# Patient Record
Sex: MALE | Race: WHITE | URBAN - METROPOLITAN AREA
[De-identification: names, ages, dates, MRNs, and addresses within clinical notes are randomized per-mention and may not be internally consistent; named-entity substitution may affect disease eponyms.]

---

## 2018-11-30 ENCOUNTER — OFFICE VISIT (OUTPATIENT)
Dept: PRIMARY CARE CLINIC | Age: 33
End: 2018-11-30

## 2018-11-30 VITALS
BODY MASS INDEX: 35.76 KG/M2 | WEIGHT: 264 LBS | TEMPERATURE: 98.2 F | HEART RATE: 75 BPM | OXYGEN SATURATION: 98 % | RESPIRATION RATE: 17 BRPM | HEIGHT: 72 IN | DIASTOLIC BLOOD PRESSURE: 85 MMHG | SYSTOLIC BLOOD PRESSURE: 148 MMHG

## 2018-11-30 DIAGNOSIS — R09.89 CHEST CONGESTION: ICD-10-CM

## 2018-11-30 DIAGNOSIS — H65.93 OTHER NONSUPPURATIVE OTITIS MEDIA OF BOTH EARS, UNSPECIFIED CHRONICITY: Primary | ICD-10-CM

## 2018-11-30 PROBLEM — E66.01 SEVERE OBESITY (HCC): Status: ACTIVE | Noted: 2018-11-30

## 2018-11-30 RX ORDER — AZITHROMYCIN 250 MG/1
250 TABLET, FILM COATED ORAL SEE ADMIN INSTRUCTIONS
Qty: 6 TAB | Refills: 0 | Status: SHIPPED | OUTPATIENT
Start: 2018-11-30 | End: 2018-12-05

## 2018-11-30 NOTE — PROGRESS NOTES
Chief Complaint Patient presents with  Ear Pain  
pt c/o ear pain, L>R, congestion and sore throat since the beginning of the week, pt states he tried otc nyquil and chaya seltzer cold and cough to help with discomfort. This note will not be viewable in [address]

## 2018-11-30 NOTE — PATIENT INSTRUCTIONS

## 2018-11-30 NOTE — PROGRESS NOTES
Subjective:  
  
Carly Santo is a 34 y.o. male who presents for possible ear infection. Symptoms include bilateral ear pain, plugged sensation in bilateral ear, congestion, sore throat, fever and cough. Onset of symptoms was 4 days ago, gradually worsening since that time. Associated symptoms include bilateral ear pressure/pain, congestion, coryza, low grade fever and nasal congestion, which have been present for 4 days . He is drinking plenty of fluids. History reviewed. No pertinent past medical history. Current Outpatient Medications Medication Sig Dispense Refill  azithromycin (ZITHROMAX) 250 mg tablet Take 1 Tab by mouth See Admin Instructions for 5 days. 6 Tab 0 Allergies Allergen Reactions  [place] Hives Social History Socioeconomic History  Marital status:  Spouse name: Not on file  Number of children: Not on file  Years of education: Not on file  Highest education level: Not on file Social Needs  Financial resource strain: Not on file  Food insecurity - worry: Not on file  Food insecurity - inability: Not on file  Transportation needs - medical: Not on file  Transportation needs - non-medical: Not on file Occupational History  Not on file Tobacco Use  Smoking status: Never Smoker  Smokeless tobacco: Never Used Substance and Sexual Activity  Alcohol use: No  
  Frequency: Never  Drug use: No  
 Sexual activity: Yes  
  Partners: Female Birth control/protection: Condom Other Topics Concern  Not on file Social History Narrative  Not on file Review of Systems A comprehensive review of systems was negative except for that written in the HPI. Objective:  
 
Visit Vitals /85 (BP 1 Location: Left arm, BP Patient Position: Sitting) Pulse 75 Temp 98.2 °F (36.8 °C) (Oral) Resp 17 Ht 6' (1.829 m) Wt 264 lb (119.7 kg) SpO2 98% BMI 35.80 kg/m² General:  alert, cooperative, no distress, appears stated age Head:  NCAT w/o lesions or tenderness Eyes: negative, conjunctivae/corneas clear. PERRL, EOM's intact. Fundi benign Right Ear: right TM diminished mobility, erythematous, bulging, left TM diminished mobility, erythematous, bulging Left Ear: left TM diminished mobility, erythematous, bulging Mouth:  Lips, mucosa, and tongue normal. Teeth and gums normal  
Neck: supple, symmetrical, trachea midline, no adenopathy, thyroid: not enlarged, symmetric, no tenderness/mass/nodules, no carotid bruit and no JVD. Lungs: clear to auscultation bilaterally Heart:  regular rate and rhythm, S1, S2 normal, no murmur, click, rub or gallop Skin: Normal.  
    
Assessment/Plan:  
 
Acute bilateral otitis media 1. Treatment:  Azithromycin 2. OTC meds (acetaminophen, ibuprofen- doses discussed), fluids, rest, avoid carbonated/ alcoholic, and caffeinated beverages. 3.  Follow up with PCP in 1 week if not improving. ICD-10-CM ICD-9-CM 1. Other nonsuppurative otitis media of both ears, unspecified chronicity H65.93 381.4 azithromycin (ZITHROMAX) 250 mg tablet 2. Chest congestion R09.89 786.9 Artur Cantrell

## 2019-07-29 ENCOUNTER — OFFICE VISIT (OUTPATIENT)
Dept: PRIMARY CARE CLINIC | Age: 34
End: 2019-07-29

## 2019-07-29 VITALS
OXYGEN SATURATION: 96 % | WEIGHT: 260.2 LBS | HEART RATE: 72 BPM | SYSTOLIC BLOOD PRESSURE: 120 MMHG | DIASTOLIC BLOOD PRESSURE: 82 MMHG | TEMPERATURE: 98.5 F | HEIGHT: 72 IN | BODY MASS INDEX: 35.24 KG/M2 | RESPIRATION RATE: 18 BRPM

## 2019-07-29 DIAGNOSIS — N20.0 KIDNEY STONE: Primary | ICD-10-CM

## 2019-07-29 DIAGNOSIS — M54.9 BACK PAIN, UNSPECIFIED BACK LOCATION, UNSPECIFIED BACK PAIN LATERALITY, UNSPECIFIED CHRONICITY: ICD-10-CM

## 2019-07-29 DIAGNOSIS — R31.29 OTHER MICROSCOPIC HEMATURIA: ICD-10-CM

## 2019-07-29 LAB
BILIRUB UR QL STRIP: NEGATIVE
GLUCOSE UR-MCNC: NEGATIVE MG/DL
KETONES P FAST UR STRIP-MCNC: NEGATIVE MG/DL
PH UR STRIP: 5.5 [PH] (ref 4.6–8)
PROT UR QL STRIP: NEGATIVE
SP GR UR STRIP: 1.02 (ref 1–1.03)
UA UROBILINOGEN AMB POC: NORMAL (ref 0.2–1)
URINALYSIS CLARITY POC: CLEAR
URINALYSIS COLOR POC: YELLOW
URINE BLOOD POC: NORMAL
URINE LEUKOCYTES POC: NEGATIVE
URINE NITRITES POC: NEGATIVE

## 2019-07-29 RX ORDER — KETOROLAC TROMETHAMINE 10 MG/1
10 TABLET, FILM COATED ORAL
Qty: 16 TAB | Refills: 0 | Status: SHIPPED | OUTPATIENT
Start: 2019-07-29 | End: 2021-04-14 | Stop reason: ALTCHOICE

## 2019-07-29 RX ORDER — KETOROLAC TROMETHAMINE 30 MG/ML
60 INJECTION, SOLUTION INTRAMUSCULAR; INTRAVENOUS ONCE
Qty: 1 VIAL | Refills: 0
Start: 2019-07-29 | End: 2019-07-29

## 2019-07-29 NOTE — PROGRESS NOTES
David Puentes is a 28 y.o. male  Identified pt with two pt identifiers(name and ). Reviewed record in preparation for visit and have obtained necessary documentation. Chief Complaint   Patient presents with    Back Pain     feels like kidney stone states patient. Visit Vitals  /82 (BP 1 Location: Left arm, BP Patient Position: Sitting)   Pulse 72   Temp 98.5 °F (36.9 °C) (Oral)   Resp 18   Ht 6' (1.829 m)   Wt 260 lb 3.2 oz (118 kg)   SpO2 96%   BMI 35.29 kg/m²       Pain Scale: 7/10  Pain Location: Back (lower)      Health Maintenance Due   Topic    DTaP/Tdap/Td series (1 - Tdap)       Coordination of Care Questionnaire:  :   1) Have you been to an emergency room, urgent care, or hospitalized since your last visit? If yes, where when, and reason for visit? no       2. Have seen or consulted any other health care provider since your last visit? If yes, where when, and reason for visit? NO      3) Do you have an Advanced Directive/ Living Will in place? NO  If yes, do we have a copy on file NO  If no, would you like information NO    Patient is accompanied by self I have received verbal consent from David Puentes to discuss any/all medical information while they are present in the room. After receiving V.O for Ketoroliac 60 mg per , Ketoroliac ml given Right deltoid deltoid,Patient tolerated injection without adverse reaction noted, observed 15 minutes post injection. 15 minutes, Post injection patient stated relief has started.

## 2019-07-29 NOTE — PROGRESS NOTES
Raquel Orona is a 28 y.o. male   Chief Complaint   Patient presents with    Back Pain     feels like kidney stone states patient. Pt states he has L sided back pain and this wraps around to the front. Pt denies hx of renal stones but he does have blood in his urine on UA. Pain is an 8/10 then goes to a 5/10. he is a 28y.o. year old male who presents for evalution. Reviewed PmHx, RxHx, FmHx, SocHx, AllgHx and updated and dated in the chart. Review of Systems - negative except as listed above in the HPI    Objective:     Vitals:    07/29/19 1151   BP: 120/82   Pulse: 72   Resp: 18   Temp: 98.5 °F (36.9 °C)   TempSrc: Oral   SpO2: 96%   Weight: 260 lb 3.2 oz (118 kg)   Height: 6' (1.829 m)       Current Outpatient Medications   Medication Sig    ketorolac tromethamine (TORADOL) 60 mg/2 mL soln 2 mL by IntraMUSCular route once for 1 dose.  ketorolac (TORADOL) 10 mg tablet Take 1 Tab by mouth every six (6) hours as needed for Pain. No current facility-administered medications for this visit. Physical Examination: General appearance - alert, well appearing, and in no distress  Chest - clear to auscultation, no wheezes, rales or rhonchi, symmetric air entry  Heart - normal rate, regular rhythm, normal S1, S2, no murmurs, rubs, clicks or gallops  Back exam - full range of motion, no tenderness, palpable spasm or pain on motion      Assessment/ Plan:   Diagnoses and all orders for this visit:    1. Kidney stone  -     ketorolac tromethamine (TORADOL) 60 mg/2 mL soln; 2 mL by IntraMUSCular route once for 1 dose. -     KETOROLAC TROMETHAMINE INJ  -     CT THER/PROPH/DIAG INJECTION, SUBCUT/IM  -     ketorolac (TORADOL) 10 mg tablet; Take 1 Tab by mouth every six (6) hours as needed for Pain. 2. Back pain, unspecified back location, unspecified back pain laterality, unspecified chronicity  -     AMB POC URINALYSIS DIP STICK AUTO W/O MICRO  -     XR ABD (KUB); Future    3.  Other microscopic hematuria  -     XR ABD (KUB); Future     symptoms not improving contact PCP for possible CT. Possible stone vs phlebolith seen in bladder region on x-ray, awaiting final report from radiology  Follow-up and Dispositions    · Return if symptoms worsen or fail to improve. I have discussed the diagnosis with the patient and the intended plan as seen in the above orders. The patient has received an after-visit summary and questions were answered concerning future plans. Pt conveyed understanding of plan.     Medication Side Effects and Warnings were discussed with patient      Kashif Doyle, DO

## 2019-07-29 NOTE — PATIENT INSTRUCTIONS
Kidney Stone: Care Instructions  Your Care Instructions    Kidney stones are formed when salts, minerals, and other substances normally found in the urine clump together. They can be as small as grains of sand or, rarely, as large as golf balls. While the stone is traveling through the ureter, which is the tube that carries urine from the kidney to the bladder, you will probably feel pain. The pain may be mild or very severe. You may also have some blood in your urine. As soon as the stone reaches the bladder, any intense pain should go away. If a stone is too large to pass on its own, you may need a medical procedure to help you pass the stone. The doctor has checked you carefully, but problems can develop later. If you notice any problems or new symptoms, get medical treatment right away. Follow-up care is a key part of your treatment and safety. Be sure to make and go to all appointments, and call your doctor if you are having problems. It's also a good idea to know your test results and keep a list of the medicines you take. How can you care for yourself at home? · Drink plenty of fluids, enough so that your urine is light yellow or clear like water. If you have kidney, heart, or liver disease and have to limit fluids, talk with your doctor before you increase the amount of fluids you drink. · Take pain medicines exactly as directed. Call your doctor if you think you are having a problem with your medicine. ? If the doctor gave you a prescription medicine for pain, take it as prescribed. ? If you are not taking a prescription pain medicine, ask your doctor if you can take an over-the-counter medicine. Read and follow all instructions on the label. · Your doctor may ask you to strain your urine so that you can collect your kidney stone when it passes. You can use a kitchen strainer or a tea strainer to catch the stone. Store it in a plastic bag until you see your doctor again.   Preventing future kidney stones  Some changes in your diet may help prevent kidney stones. Depending on the cause of your stones, your doctor may recommend that you:  · Drink plenty of fluids, enough so that your urine is light yellow or clear like water. If you have kidney, heart, or liver disease and have to limit fluids, talk with your doctor before you increase the amount of fluids you drink. · Limit coffee, tea, and alcohol. Also avoid grapefruit juice. · Do not take more than the recommended daily dose of vitamins C and D.  · Avoid antacids such as Gaviscon, Maalox, Mylanta, or Tums. · Limit the amount of salt (sodium) in your diet. · Eat a balanced diet that is not too high in protein. · Limit foods that are high in a substance called oxalate, which can cause kidney stones. These foods include dark green vegetables, rhubarb, chocolate, wheat bran, nuts, cranberries, and beans. When should you call for help? Call your doctor now or seek immediate medical care if:    · You cannot keep down fluids.     · Your pain gets worse.     · You have a fever or chills.     · You have new or worse pain in your back just below your rib cage (the flank area).     · You have new or more blood in your urine.    Watch closely for changes in your health, and be sure to contact your doctor if:    · You do not get better as expected. Where can you learn more? Go to http://zara-mac.info/. Enter U215 in the search box to learn more about \"Kidney Stone: Care Instructions. \"  Current as of: October 31, 2018  Content Version: 12.1  © 2661-6381 [employer]. Care instructions adapted under license by [employer] (which disclaims liability or warranty for this information). If you have questions about a medical condition or this instruction, always ask your healthcare professional. [address] any warranty or liability for your use of this information.

## 2019-08-26 ENCOUNTER — OFFICE VISIT (OUTPATIENT)
Dept: PRIMARY CARE CLINIC | Age: 34
End: 2019-08-26

## 2019-08-26 VITALS
BODY MASS INDEX: 35.08 KG/M2 | WEIGHT: 259 LBS | HEIGHT: 72 IN | RESPIRATION RATE: 18 BRPM | OXYGEN SATURATION: 97 % | DIASTOLIC BLOOD PRESSURE: 86 MMHG | SYSTOLIC BLOOD PRESSURE: 135 MMHG | HEART RATE: 87 BPM | TEMPERATURE: 98.2 F

## 2019-08-26 DIAGNOSIS — M25.572 ACUTE LEFT ANKLE PAIN: ICD-10-CM

## 2019-08-26 DIAGNOSIS — S93.402A SPRAIN OF LEFT ANKLE, UNSPECIFIED LIGAMENT, INITIAL ENCOUNTER: Primary | ICD-10-CM

## 2019-08-26 RX ORDER — IBUPROFEN 800 MG/1
800 TABLET ORAL
Qty: 20 TAB | Refills: 0 | Status: SHIPPED | OUTPATIENT
Start: 2019-08-26 | End: 2019-08-26 | Stop reason: SDUPTHER

## 2019-08-26 RX ORDER — IBUPROFEN 800 MG/1
800 TABLET ORAL
Qty: 20 TAB | Refills: 0 | Status: SHIPPED | OUTPATIENT
Start: 2019-08-26 | End: 2021-08-11 | Stop reason: ALTCHOICE

## 2019-08-26 NOTE — PROGRESS NOTES
Nader Fernández is a 28 y.o. male  HIPAA verified by two patient identifiers. Chief Complaint   Patient presents with    Ankle Injury     left on sat 8/24, pain8/10     Visit Vitals  /86 (BP 1 Location: Left arm, BP Patient Position: Sitting)   Pulse 87   Temp 98.2 °F (36.8 °C) (Oral)   Resp 18   Ht 6' 0.25\" (1.835 m)   Wt 259 lb (117.5 kg)   SpO2 97%   BMI 34.88 kg/m²       Pain Scale: 8/10  Pain Location: Ankle  1. Have you been to the ER, urgent care clinic since your last visit? Hospitalized since your last visit? No    2. Have you seen or consulted any other health care providers outside of the [address] [place] [address] since your last visit? Include any pap smears or colon screening.  No

## 2019-08-26 NOTE — PATIENT INSTRUCTIONS
Ankle Sprain: Care Instructions  Your Care Instructions    An ankle sprain can happen when you twist your ankle. The ligaments that support the ankle can get stretched and torn. Often the ankle is swollen and painful. Ankle sprains may take from several weeks to several months to heal. Usually, the more pain and swelling you have, the more severe your ankle sprain is and the longer it will take to heal. You can heal faster and regain strength in your ankle with good home treatment. It is very important to give your ankle time to heal completely, so that you do not easily hurt your ankle again. Follow-up care is a key part of your treatment and safety. Be sure to make and go to all appointments, and call your doctor if you are having problems. It's also a good idea to know your test results and keep a list of the medicines you take. How can you care for yourself at home? · Prop up your foot on pillows as much as possible for the next 3 days. Try to keep your ankle above the level of your heart. This will help reduce the swelling. · Follow your doctor's directions for wearing a splint or elastic bandage. Wrapping the ankle may help reduce or prevent swelling. · Your doctor may give you a splint, a brace, an air stirrup, or another form of ankle support to protect your ankle until it is healed. Wear it as directed while your ankle is healing. Do not remove it unless your doctor tells you to. After your ankle has healed, ask your doctor whether you should wear the brace when you exercise. · Put ice or cold packs on your injured ankle for 10 to 20 minutes at a time. Try to do this every 1 to 2 hours for the next 3 days (when you are awake) or until the swelling goes down. Put a thin cloth between the ice and your skin. · You may need to use crutches until you can walk without pain. If you do use crutches, try to bear some weight on your injured ankle if you can do so without pain.  This helps the ankle heal.  · Take pain medicines exactly as directed. ? If the doctor gave you a prescription medicine for pain, take it as prescribed. ? If you are not taking a prescription pain medicine, ask your doctor if you can take an over-the-counter medicine. · If you have been given ankle exercises to do at home, do them exactly as instructed. These can promote healing and help prevent lasting weakness. When should you call for help? Call your doctor now or seek immediate medical care if:    · Your pain is getting worse.     · Your swelling is getting worse.     · Your splint feels too tight or you are unable to loosen it.    Watch closely for changes in your health, and be sure to contact your doctor if:    · You are not getting better after 1 week. Where can you learn more? Go to http://zara-mac.info/. Enter F238 in the search box to learn more about \"Ankle Sprain: Care Instructions. \"  Current as of: September 20, 2018  Content Version: 12.1  © 3469-3073 [employer]. Care instructions adapted under license by [employer] (which disclaims liability or warranty for this information). If you have questions about a medical condition or this instruction, always ask your healthcare professional. [place] [address] any warranty or liability for your use of this information.

## 2019-08-26 NOTE — PROGRESS NOTES
Subjective:      Nader Fernández is a 28 y.o. male seen for evaluation and treatment of a left ankle injury. This is evaluated as a personal injury. The injury was sustained 2 days ago, and occurred when he stepped off a friend's porch into a hole, rolled his ankle and fell. He did not hear or sense a pop or snap at the time of the injury. The patient notes pain and moderate swelling since the injury. He has injured this site in the past.  History of left ankle sprain 6-7 years ago. History reviewed. No pertinent past medical history. History reviewed. No pertinent surgical history. Allergies   Allergen Reactions    Nectarine Anaphylaxis    Strawberry Hives           Objective:     Visit Vitals  /86 (BP 1 Location: Left arm, BP Patient Position: Sitting)   Pulse 87   Temp 98.2 °F (36.8 °C) (Oral)   Resp 18   Ht 6' 0.25\" (1.835 m)   Wt 259 lb (117.5 kg)   SpO2 97%   BMI 34.88 kg/m²      Appearance: alert, well appearing, and in no distress. Musculoskeletal exam: abnormal exam of left ankle: moderate swelling to left ankle, tender to lateral ankle with ecchymosis noted, decrease ROM but rainer to weight bear. Imaging    Status: Final result (Exam End: 8/26/2019 09:17) Provider Status: Reviewed   Study Result     EXAM:  XR ANKLE LT MIN 3 V.     INDICATION:  Acute left ankle pain.     COMPARISON: None.     FINDINGS: Three views of the left ankle demonstrate no fracture or disruption of  the ankle mortise. There is marked lateral soft tissue swelling as well is a  large ankle effusion. Incidental note is made of the plantar and Achilles spurs.     IMPRESSION  IMPRESSION: Acute lateral left ankle sprain          Assessment/Plan:       ICD-10-CM ICD-9-CM    1.  Sprain of left ankle, unspecified ligament, initial encounter S93.402A 845.00 REFERRAL TO PODIATRY   2. Acute left ankle pain M25.572 719.47 XR ANKLE LT MIN 3 V      REFERRAL TO PODIATRY       Orders Placed This Encounter    XR ANKLE LT MIN 3 V    REFERRAL TO PODIATRY    DISCONTD: ibuprofen (MOTRIN) 800 mg tablet    ibuprofen (MOTRIN) 800 mg tablet     The patient is advised to rest, apply cold or ice intermittently, elevate affected extremity and use ACE bandage. Meds as directed, take w/ food. Work note. See Podiatry for persistent or worsening symptoms. RTC prn. Mary Para, NP  This note will not be viewable in [address]

## 2019-08-26 NOTE — LETTER
NOTIFICATION RETURN TO WORK / SCHOOL 
 
8/26/2019 9:33 AM 
 
Mr. Nahum Jimenez Stanfordyves [facility] [address] [address] To Whom It May Concern: 
 
Nahum Jimenez is currently under the care of [address] [place] [address]. No work 8/26 and 8/27/2019. He will return to work/school on 8/28/2019. Recommend light duty - no excessive walking or standing for remainder of week. Return to full duty 9/2/2019. If there are questions or concerns please have the patient contact our office. Sincerely, Erasmo De Anda NP

## 2020-02-03 ENCOUNTER — OFFICE VISIT (OUTPATIENT)
Dept: PRIMARY CARE CLINIC | Age: 35
End: 2020-02-03

## 2020-02-03 VITALS
WEIGHT: 260 LBS | BODY MASS INDEX: 35.21 KG/M2 | SYSTOLIC BLOOD PRESSURE: 129 MMHG | HEIGHT: 72 IN | HEART RATE: 70 BPM | OXYGEN SATURATION: 96 % | TEMPERATURE: 98.3 F | DIASTOLIC BLOOD PRESSURE: 89 MMHG | RESPIRATION RATE: 16 BRPM

## 2020-02-03 DIAGNOSIS — H61.23 BILATERAL IMPACTED CERUMEN: Primary | ICD-10-CM

## 2020-02-03 NOTE — PROGRESS NOTES
Pt c/o bilateral hearing issues. Started off as a pressure feeling thinking it was a tooth. Pt has h/o ear wax buildup. Pt has built up ear wax bilaterally--will flush. States having slight congestion. No other complaints.

## 2020-02-03 NOTE — PATIENT INSTRUCTIONS
Earwax Blockage: Care Instructions Your Care Instructions Earwax is a natural substance that protects the ear canal. Normally, earwax drains from the ears and does not cause problems. Sometimes earwax builds up and hardens. Earwax blockage (also called cerumen impaction) can cause some loss of hearing and pain. When wax is tightly packed, you will need to have your doctor remove it. Follow-up care is a key part of your treatment and safety. Be sure to make and go to all appointments, and call your doctor if you are having problems. It's also a good idea to know your test results and keep a list of the medicines you take. How can you care for yourself at home? · Do not try to remove earwax with cotton swabs, fingers, or other objects. This can make the blockage worse and damage the eardrum. · If your doctor recommends that you try to remove earwax at home: ? Soften and loosen the earwax with warm mineral oil. You also can try hydrogen peroxide mixed with an equal amount of room temperature water. Place 2 drops of the fluid, warmed to body temperature, in the ear two times a day for up to 5 days. ? Once the wax is loose and soft, all that is usually needed to remove it from the ear canal is a gentle, warm shower. Direct the water into the ear, then tip your head to let the earwax drain out. Dry your ear thoroughly with a hair dryer set on low. Hold the dryer several inches from your ear. ? If the warm mineral oil and shower do not work, use an over-the-counter wax softener. Read and follow all instructions on the label. After using the wax softener, use an ear syringe to gently flush the ear. Make sure the flushing solution is body temperature. Cool or hot fluids in the ear can cause dizziness. When should you call for help? Call your doctor now or seek immediate medical care if: 
  · Pus or blood drains from your ear.  
  · Your ears are ringing or feel full.  
  · You have a loss of hearing.  Watch closely for changes in your health, and be sure to contact your doctor if: 
  · You have pain or reduced hearing after 1 week of home treatment.  
  · You have any new symptoms, such as nausea or balance problems. Where can you learn more? Go to http://zara-mac.info/. Enter Q224 in the search box to learn more about \"Earwax Blockage: Care Instructions. \" Current as of: June 26, 2019 Content Version: 12.2 © 9877-5542 [employer]. Care instructions adapted under license by [employer] (which disclaims liability or warranty for this information). If you have questions about a medical condition or this instruction, always ask your healthcare professional. [address] any warranty or liability for your use of this information.

## 2020-02-06 NOTE — PROGRESS NOTES
HISTORY OF PRESENT ILLNESS  Tameka Bhakta is a 27 y.o. male. HPI  Chief Complaint   Patient presents with    Wax in Ear     Pt presents with complaints of diminished hearing for several days. Hx cerumen impaction in past.  Mild congestion. Denies fevers, chills, sore throat, cough or nasal discharge. No treatment to date. Past Medical History:   Diagnosis Date    Calculus of kidney 2019     History reviewed. No pertinent surgical history. Allergies   Allergen Reactions    Nectarine Anaphylaxis    Strawberry Hives       ROS  A comprehensive review of systems was obtained and negative except findings in the HPI    Physical Exam  Constitutional:       Appearance: Normal appearance. HENT:      Head: Normocephalic and atraumatic. Right Ear: There is impacted cerumen. Left Ear: There is impacted cerumen. Nose: Nose normal.      Mouth/Throat:      Mouth: Mucous membranes are moist.      Pharynx: Oropharynx is clear. Lymphadenopathy:      Cervical: No cervical adenopathy. Neurological:      Mental Status: He is alert. ASSESSMENT and PLAN    ICD-10-CM ICD-9-CM    1. Bilateral impacted cerumen H61.23 380.4 REMOVAL IMPACTED CERUMEN IRRIGATION/LVG UNILAT     Nurse irrigated both ears with 1/2 strength peroxide and saline solution. Moderate amount of cerumen removed from both ears. Following irrigation, canals patent and normal, TM's normal and pt has complete resolution of his sx's.  rtc prn. Abigail Hernandez NP  This note will not be viewable in [address]

## 2020-02-11 ENCOUNTER — OFFICE VISIT (OUTPATIENT)
Dept: PRIMARY CARE CLINIC | Age: 35
End: 2020-02-11

## 2020-02-11 VITALS
TEMPERATURE: 97.9 F | BODY MASS INDEX: 35.43 KG/M2 | HEIGHT: 72 IN | SYSTOLIC BLOOD PRESSURE: 127 MMHG | WEIGHT: 261.6 LBS | DIASTOLIC BLOOD PRESSURE: 95 MMHG | OXYGEN SATURATION: 97 % | HEART RATE: 78 BPM | RESPIRATION RATE: 20 BRPM

## 2020-02-11 DIAGNOSIS — J01.90 ACUTE NON-RECURRENT SINUSITIS, UNSPECIFIED LOCATION: Primary | ICD-10-CM

## 2020-02-11 RX ORDER — AMOXICILLIN AND CLAVULANATE POTASSIUM 875; 125 MG/1; MG/1
1 TABLET, FILM COATED ORAL EVERY 12 HOURS
Qty: 14 TAB | Refills: 0 | Status: SHIPPED | OUTPATIENT
Start: 2020-02-11 | End: 2020-02-18

## 2020-02-11 RX ORDER — FLUTICASONE PROPIONATE 50 MCG
2 SPRAY, SUSPENSION (ML) NASAL DAILY
Qty: 1 BOTTLE | Refills: 0 | Status: SHIPPED | OUTPATIENT
Start: 2020-02-11

## 2020-02-11 NOTE — PROGRESS NOTES
Sherine Said is a 27 y.o. male    Room:6    Chief Complaint   Patient presents with    Sinus Infection     Pt States \" congestion, SOB and sinus pressure little bit of headache, cough had a sore throat when i woke up, has taken chaya-seltzer and zarbies tea\". Visit Vitals  BP (!) 127/95 (BP 1 Location: Left arm, BP Patient Position: Sitting)   Pulse 78   Temp 97.9 °F (36.6 °C) (Oral)   Resp 20   Ht 6' (1.829 m)   Wt 261 lb 9.6 oz (118.7 kg)   SpO2 97%   BMI 35.48 kg/m²       Pain Scale: 0 - No pain/10    1. Have you been to the ER, urgent care clinic since your last visit? Hospitalized since your last visit? No    2. Have you seen or consulted any other health care providers outside of the [address] [place] [address] since your last visit? Include any pap smears or colon screening.  No

## 2020-02-11 NOTE — PROGRESS NOTES
Subjective:   Sherine Rubalcava is a 27 y.o. male who complains of congestion, sore throat, productive cough, brief sinus headaches, bilateral sinus pain and clear nasal discharge for 5 days, gradually worsening since that time. Chest is starting to feel little tight and mild SOB. He denies a history of wheezing. Denies any fevers or chills. Pt's wife and child recently sick with URI. Evaluation to date: none. Treatment to date: OTC products. Patient does not smoke cigarettes. Relevant PMH:   Past Medical History:   Diagnosis Date    Calculus of kidney 2019     History reviewed. No pertinent surgical history. Allergies   Allergen Reactions    Nectarine Anaphylaxis    Aurora Hives       Review of Systems  Pertinent items are noted in HPI. Objective:     Visit Vitals  BP (!) 127/95 (BP 1 Location: Left arm, BP Patient Position: Sitting)   Pulse 78   Temp 97.9 °F (36.6 °C) (Oral)   Resp 20   Ht 6' (1.829 m)   Wt 261 lb 9.6 oz (118.7 kg)   SpO2 97%   BMI 35.48 kg/m²     General:  alert, cooperative, no distress   Eyes: negative   Ears: normal external ear canals AU, TM's with mild fluid bilaterally, no erythema   Sinuses: Normal paranasal sinuses without tenderness   Mouth:  Lips, mucosa, and tongue normal. Teeth and gums normal and normal findings: oropharynx pink & moist without lesions or evidence of thrush   Neck: supple, symmetrical, trachea midline and no adenopathy. Heart: S1 and S2 normal, no murmurs noted. Lungs: clear to auscultation bilaterally        Assessment/Plan:       ICD-10-CM ICD-9-CM    1. Acute non-recurrent sinusitis, unspecified location J01.90 461.9      Orders Placed This Encounter    amoxicillin-clavulanate (AUGMENTIN) 875-125 mg per tablet    fluticasone propionate (FLONASE) 50 mcg/actuation nasal spray     Discussed the dx and tx of sinusitis. Suggested symptomatic OTC remedies. Antibiotics per orders. Nasal steroids per orders. RTC prn.       Hugo Villegas NP  This note will not be viewable in [address]

## 2020-02-11 NOTE — PATIENT INSTRUCTIONS
Sinusitis: Care Instructions Your Care Instructions Sinusitis is an infection of the lining of the sinus cavities in your head. Sinusitis often follows a cold. It causes pain and pressure in your head and face. In most cases, sinusitis gets better on its own in 1 to 2 weeks. But some mild symptoms may last for several weeks. Sometimes antibiotics are needed. Follow-up care is a key part of your treatment and safety. Be sure to make and go to all appointments, and call your doctor if you are having problems. It's also a good idea to know your test results and keep a list of the medicines you take. How can you care for yourself at home? · Take an over-the-counter pain medicine, such as acetaminophen (Tylenol), ibuprofen (Advil, Motrin), or naproxen (Aleve). Read and follow all instructions on the label. · If the doctor prescribed antibiotics, take them as directed. Do not stop taking them just because you feel better. You need to take the full course of antibiotics. · Be careful when taking over-the-counter cold or flu medicines and Tylenol at the same time. Many of these medicines have acetaminophen, which is Tylenol. Read the labels to make sure that you are not taking more than the recommended dose. Too much acetaminophen (Tylenol) can be harmful. · Breathe warm, moist air from a steamy shower, a hot bath, or a sink filled with hot water. Avoid cold, dry air. Using a humidifier in your home may help. Follow the directions for cleaning the machine. · Use saline (saltwater) nasal washes to help keep your nasal passages open and wash out mucus and bacteria. You can buy saline nose drops at a grocery store or drugstore. Or you can make your own at home by adding 1 teaspoon of salt and 1 teaspoon of baking soda to 2 cups of distilled water. If you make your own, fill a bulb syringe with the solution, insert the tip into your nostril, and squeeze gently. Mark Ply your nose. · Put a hot, wet towel or a warm gel pack on your face 3 or 4 times a day for 5 to 10 minutes each time. · Try a decongestant nasal spray like oxymetazoline (Afrin). Do not use it for more than 3 days in a row. Using it for more than 3 days can make your congestion worse. When should you call for help? Call your doctor now or seek immediate medical care if: 
  · You have new or worse swelling or redness in your face or around your eyes.  
  · You have a new or higher fever.  
 Watch closely for changes in your health, and be sure to contact your doctor if: 
  · You have new or worse facial pain.  
  · The mucus from your nose becomes thicker (like pus) or has new blood in it.  
  · You are not getting better as expected. Where can you learn more? Go to http://zara-mac.info/. Enter G506 in the search box to learn more about \"Sinusitis: Care Instructions. \" Current as of: October 21, 2018 Content Version: 12.2 © 0149-9495 [employer]. Care instructions adapted under license by [employer] (which disclaims liability or warranty for this information). If you have questions about a medical condition or this instruction, always ask your healthcare professional. [address] any warranty or liability for your use of this information.

## 2021-04-14 ENCOUNTER — OFFICE VISIT (OUTPATIENT)
Dept: PRIMARY CARE CLINIC | Age: 36
End: 2021-04-14

## 2021-04-14 VITALS
SYSTOLIC BLOOD PRESSURE: 138 MMHG | HEART RATE: 64 BPM | WEIGHT: 257.6 LBS | BODY MASS INDEX: 34.89 KG/M2 | RESPIRATION RATE: 16 BRPM | OXYGEN SATURATION: 97 % | DIASTOLIC BLOOD PRESSURE: 98 MMHG | HEIGHT: 72 IN | TEMPERATURE: 97 F

## 2021-04-14 DIAGNOSIS — R03.0 ELEVATED BLOOD PRESSURE READING WITHOUT DIAGNOSIS OF HYPERTENSION: Primary | ICD-10-CM

## 2021-04-14 PROCEDURE — 99213 OFFICE O/P EST LOW 20 MIN: CPT | Performed by: NURSE PRACTITIONER

## 2021-04-14 RX ORDER — HYDROCODONE BITARTRATE AND ACETAMINOPHEN 7.5; 325 MG/1; MG/1
TABLET ORAL
COMMUNITY
Start: 2021-03-31 | End: 2021-08-11 | Stop reason: ALTCHOICE

## 2021-04-14 RX ORDER — AMOXICILLIN 500 MG/1
TABLET, FILM COATED ORAL
COMMUNITY
Start: 2021-03-31 | End: 2021-08-11 | Stop reason: ALTCHOICE

## 2021-04-14 NOTE — PATIENT INSTRUCTIONS
Elevated Blood Pressure: Care Instructions Your Care Instructions Blood pressure is a measure of how hard the blood pushes against the walls of your arteries. It's normal for blood pressure to go up and down throughout the day. But if it stays up over time, you have high blood pressure. Two numbers tell you your blood pressure. The first number is the systolic pressure. It shows how hard the blood pushes when your heart is pumping. The second number is the diastolic pressure. It shows how hard the blood pushes between heartbeats, when your heart is relaxed and filling with blood. An ideal blood pressure in adults is less than 120/80 (say \"120 over 80\"). High blood pressure is 140/90 or higher. You have high blood pressure if your top number is 140 or higher or your bottom number is 90 or higher, or both. The main test for high blood pressure is simple, fast, and painless. To diagnose high blood pressure, your doctor will test your blood pressure at different times. After testing your blood pressure, your doctor may ask you to test it again when you are home. If you are diagnosed with high blood pressure, you can work with your doctor to make a long-term plan to manage it. Follow-up care is a key part of your treatment and safety. Be sure to make and go to all appointments, and call your doctor if you are having problems. It's also a good idea to know your test results and keep a list of the medicines you take. How can you care for yourself at home? · Do not smoke. Smoking increases your risk for heart attack and stroke. If you need help quitting, talk to your doctor about stop-smoking programs and medicines. These can increase your chances of quitting for good. · Stay at a healthy weight. · Try to limit how much sodium you eat to less than 2,300 milligrams (mg) a day. Your doctor may ask you to try to eat less than 1,500 mg a day. · Be physically active.  Get at least 30 minutes of exercise on most days of the week. Walking is a good choice. You also may want to do other activities, such as running, swimming, cycling, or playing tennis or team sports. · Avoid or limit alcohol. Talk to your doctor about whether you can drink any alcohol. · Eat plenty of fruits, vegetables, and low-fat dairy products. Eat less saturated and total fats. · Learn how to check your blood pressure at home. When should you call for help? Call your doctor now or seek immediate medical care if: 
? · Your blood pressure is much higher than normal (such as 180/110 or higher). ? · You think high blood pressure is causing symptoms such as: ¨ Severe headache. ¨ Blurry vision. ? Watch closely for changes in your health, and be sure to contact your doctor if: 
? · You do not get better as expected. Where can you learn more? Go to http://www.gray.com/. Enter Q805 in the search box to learn more about \"Elevated Blood Pressure: Care Instructions. \" Current as of: September 21, 2016 Content Version: 11.4 © 9848-3306 [employer]. Care instructions adapted under license by [employer] (which disclaims liability or warranty for this information). If you have questions about a medical condition or this instruction, always ask your healthcare professional. [address] any warranty or liability for your use of this information.

## 2021-04-14 NOTE — PROGRESS NOTES
Chief Complaint   Patient presents with    Blood Pressure Check     Patient in room #4 and stated in for dental visit today for tooth pulling and B/P noted high, here to have recheck

## 2021-04-14 NOTE — PROGRESS NOTES
Subjective:   Hector Mariscal is a 38 y.o. male with elevated blood pressure. The patient was at his dentist this morning for a tooth extraction. He was noted to have a blood pressure of 158/121. When repeated manually it was 128/101. The dentist would not perform the procedure and was told he needed to follow-up for his elevated blood pressure. I have personally reviewed previous blood pressure readings from office visits at this clinic with the patient. The patient states he has\" whitecoat syndrome\" and his blood pressure gets elevated at doctor's appointments. Both of his parents do have a history of hypertension. He [facility] get physicals through [facility]. He had a biometric exam performed 3 weeks ago and he does not recall being told his blood pressure was elevated. He has been told before about elevated BP. He has not seen his primary care doctor in several years for a physical exam.    ROS  Constitutional: Negative for activity change, appetite change, chills, fever or fatigue. HEENT: Negative  trouble swallowing. Vision: Negative for visual changes. Respiratory: Negative for cough or shortness of breath  Cardiovascular: Negative for chest pain or light-headedness  Allergic/ Immunologic: Negative for food allergies and environmental allergies, Negative for bruising or abnormal bleeding. Neurological: negative for headache, dizziness or tremor  Hematological: negative for bleeding disorders  Mental Health- Negative for behavioral health problems, sleep disturbance. Past Medical History:   Diagnosis Date    Calculus of kidney 2019     History reviewed. No pertinent surgical history.         Current Outpatient Medications   Medication Sig Dispense Refill    amoxicillin 500 mg tab TAKE 1 TABLET BY MOUTH THREE TIMES DAILY UNTIL ALL TAKEN      HYDROcodone-acetaminophen (NORCO) 7.5-325 mg per tablet TAKE 1 TABLET BY MOUTH EVERY 4 TO 6 HOURS AS NEEDED FOR PAIN      fluticasone propionate (FLONASE) 50 mcg/actuation nasal spray 2 Sprays by Both Nostrils route daily. 1 Bottle 0    ibuprofen (MOTRIN) 800 mg tablet Take 1 Tab by mouth every eight (8) hours as needed for Pain. 20 Tab 0        Objective:   Visit Vitals  BP (!) 138/98   Pulse 64   Temp 97 °F (36.1 °C) (Skin)   Resp 16   Ht 6' (1.829 m)   Wt 257 lb 9.6 oz (116.8 kg)   SpO2 97%   BMI 34.94 kg/m²      Appearance alert, well appearing, and in no distress and overweight. General exam BP noted to be borderline elevated today in office, S1, S2 normal, no gallop, no murmur, chest clear, no JVD, no HSM, no edema, CVS exam  - normal rate, regular rhythm, normal S1, S2, no murmurs, rubs, clicks or gallops. Lab review: no lab studies available for review at time of visit. Self reported normal Biometric screening. Assessment:      ICD-10-CM ICD-9-CM    1. Elevated blood pressure reading without diagnosis of hypertension  R03.0 796.2        Hypertension needs improvement. Spent >20 minutes educating patient on lifestyle modification. Plan:   Discussed loss of 5-10 % of weight  reviewed diet, exercise and weight control  recommended sodium restriction  copy of written low fat low cholesterol diet provided and reviewed  reviewed potential future medication changes and side effects  recommended taking BP at home each day at same time and recording. .  Strongly recommended physical exam with PCP/ labs. If all of these measures are not effective, your doctor aleyda have to start a medication. It was a pleasure to see you in the office today. Please call if you have any further questions or concerns. I am available through the portal system.      Signed By: DEYANIRA Dudley     April 14, 2021

## 2021-07-30 ENCOUNTER — OFFICE VISIT (OUTPATIENT)
Dept: PRIMARY CARE CLINIC | Age: 36
End: 2021-07-30

## 2021-07-30 VITALS
RESPIRATION RATE: 17 BRPM | DIASTOLIC BLOOD PRESSURE: 90 MMHG | HEIGHT: 72 IN | WEIGHT: 253.8 LBS | OXYGEN SATURATION: 100 % | HEART RATE: 66 BPM | TEMPERATURE: 97.5 F | SYSTOLIC BLOOD PRESSURE: 135 MMHG | BODY MASS INDEX: 34.38 KG/M2

## 2021-07-30 DIAGNOSIS — S61.214A LACERATION OF RIGHT RING FINGER WITHOUT FOREIGN BODY WITHOUT DAMAGE TO NAIL, INITIAL ENCOUNTER: Primary | ICD-10-CM

## 2021-07-30 PROCEDURE — 90715 TDAP VACCINE 7 YRS/> IM: CPT | Performed by: EMERGENCY MEDICINE

## 2021-07-30 PROCEDURE — 99203 OFFICE O/P NEW LOW 30 MIN: CPT | Performed by: EMERGENCY MEDICINE

## 2021-07-30 RX ORDER — AMOXICILLIN 500 MG/1
CAPSULE ORAL
COMMUNITY
Start: 2021-04-29 | End: 2021-08-11 | Stop reason: ALTCHOICE

## 2021-07-30 NOTE — PROGRESS NOTES
Adrianne Lopez is a 38 y.o. male    Room:4    Chief Complaint   Patient presents with    Hand Laceration     Pt c/o right ring laceration. Pt States \" i smashed my finger and it split open thought i was going to pass out, i was doing scrap metal and slid to fast it was an old smoker that tailgated the truck', i was in a glove when it happened i just wrapped \". Visit Vitals  BP (!) 135/90 (BP 1 Location: Left arm, BP Patient Position: Sitting, BP Cuff Size: Adult)   Pulse 66   Temp 97.5 °F (36.4 °C) (Temporal)   Resp 17   Ht 6' (1.829 m)   Wt 253 lb 12.8 oz (115.1 kg)   SpO2 100%   BMI 34.42 kg/m²       Pain Scale: 7/10    1. Have you been to the ER, urgent care clinic since your last visit? Hospitalized since your last visit? no    2. Have you seen or consulted any other health care providers outside of the [address] [place] [address] since your last visit? Include any pap smears or colon screening.  no

## 2021-07-30 NOTE — PROGRESS NOTES
Chief Complaint   Patient presents with    Hand Laceration     Pt c/o right ring laceration. Pt States \" i smashed my finger and it split open thought i was going to pass out, i was doing scrap metal and slid to fast it was an old smoker that tailgated the truck', i was in a glove when it happened i just wrapped \". HPI:  Was taking some scrap metal to recycle and smashed his right 4th finger between the tailgate of truck and the metal, splitting open the skin of the palmar aspect. Moderate amount of initial pain. Last tetanus more than 5 years, possibly less than ten. Has a high pain tolerance. Review of Systems - no recent weight loss/gain, fevers, chills, chest pain, shortness of breath, cough, nausea, vomiting, diarrhea, urinary frequency/urgency/dysuria, or rashes. Otherwise, ROS negative except as per HPI    Past Medical History:   Diagnosis Date    Calculus of kidney 2019       Past Surgical History:   Procedure Laterality Date    HX ORTHOPAEDIC      glass foreign body removal    HX WISDOM TEETH EXTRACTION         Family History   Problem Relation Age of Onset    No Known Problems Mother     Prostate Cancer Father     Diabetes Father     Anxiety Brother     OCD Brother        Social History     Socioeconomic History    Marital status:      Spouse name: Not on file    Number of children: Not on file    Years of education: Not on file    Highest education level: Not on file   Occupational History    Not on file   Tobacco Use    Smoking status: Former Smoker     Quit date: 4/15/2015     Years since quittin.2    Smokeless tobacco: Never Used   Substance and Sexual Activity    Alcohol use:  Yes     Alcohol/week: 15.0 standard drinks     Types: 15 Standard drinks or equivalent per week    Drug use: No    Sexual activity: Yes     Partners: Female     Birth control/protection: Condom   Other Topics Concern    Not on file   Social History Narrative    Not on file     Social Determinants of Health     Financial Resource Strain:     Difficulty of Paying Living Expenses:    Food Insecurity:     Worried About Running Out of Food in the Last Year:     [address] in the Last Year:    Transportation Needs:     Lack of Transportation (Medical):  Lack of Transportation (Non-Medical):    Physical Activity:     Days of Exercise per Week:     Minutes of Exercise per Session:    Stress:     Feeling of Stress :    Social Connections:     Frequency of Communication with Friends and Family:     Frequency of Social Gatherings with Friends and Family:     Attends Gnosticism Services:     Active Member of Clubs or Organizations:     Attends Club or Organization Meetings:     Marital Status:    Intimate Partner Violence:     Fear of Current or Ex-Partner:     Emotionally Abused:     Physically Abused:     Sexually Abused:        Current Outpatient Medications on File Prior to Visit   Medication Sig Dispense Refill    fluticasone propionate (FLONASE) 50 mcg/actuation nasal spray 2 Sprays by Both Nostrils route daily. 1 Bottle 0    amoxicillin (AMOXIL) 500 mg capsule TAKE 1 CAPSULE BY MOUTH THREE TIMES DAILY UNTIL GONE (Patient not taking: Reported on 7/30/2021)      amoxicillin 500 mg tab TAKE 1 TABLET BY MOUTH THREE TIMES DAILY UNTIL ALL TAKEN (Patient not taking: Reported on 7/30/2021)      HYDROcodone-acetaminophen (NORCO) 7.5-325 mg per tablet TAKE 1 TABLET BY MOUTH EVERY 4 TO 6 HOURS AS NEEDED FOR PAIN (Patient not taking: Reported on 7/30/2021)      ibuprofen (MOTRIN) 800 mg tablet Take 1 Tab by mouth every eight (8) hours as needed for Pain. (Patient not taking: Reported on 7/30/2021) 20 Tab 0     No current facility-administered medications on file prior to visit.        Allergies   Allergen Reactions    Nectarine Anaphylaxis    Strawberry Hives       PE:    General:  Well-developed, well-nourished male in no apparent distress  HEENT:  Normocephalic, atraumatic, Pupils are equal, round, & reactive to light & accommodation. Extraocular movements intact. TM's normal, external auditory exam normal.  Oropharynx grossly normal.  No tonsillar enlargement, erythema, or exudates. Neck:  Supple, nontender, full ROM. No lymphadenopathy. No thyromegaly. Chest:  clear to auscultation without rales, rhonchi, or wheezes heard. CV:  Regular rate & rhythm without murmurs, gallops, clicks, or rubs. Abdomen:  soft, nontender, nondistended, normoactive bowel sounds, no organomegaly. Extremities:  No edema, clubbing, or cyanosis. Full ROM, nontender. Right 4th finger has a semicircular shaped laceration 1.6 cm in length on the palmar surface of the distal phalanz exposing the subQ fat but no bony exposure. No pulsatile bleeding, just a slight ooze easily controlled with holding pressure. Good capillary refill distally. No tendon or vessel involvement. No foreign body. Procedure note:  Cleaned with dermal cleanser, anesthetized with 2% plain lidocaine in a digital block, trimmed edges of wound for better closure. Sutured with 5-0 Ethilon with 7 interrupted sutures. Good approximation and hemostasis achieved. Patient tolerated well. Orders Placed This Encounter             diphth,pertus,acell,,tetanus (BOOSTRIX TDAP) 2.5-8-5 Lf-mcg-Lf/0.5mL susp suspension     Si.5 mL by IntraMUSCular route once for 1 dose. Dispense:  0.5 mL     Refill:  0     X-ray:  Study Result    Narrative & Impression   EXAM: XR 4TH FINGER RT MIN 2 V     INDICATION: Right ring finger crush injury and laceration     COMPARISON: None.     TECHNIQUE: AP left hand; oblique and lateral left fourth finger views     FINDINGS: Laceration in the soft tissues anterior to the fourth distal phalanx  is visible. No radiodense foreign body. No fracture or dislocation on plain  film. Joint spaces are preserved. Alignment is within normal limits. Bone  mineralization is unremarkable.  No chondrocalcinosis or periosteal reaction. Cortical thickening of the ulnar aspect of the fifth metacarpal represents  congenital variant versus sequela of old, healed fracture.     IMPRESSION  Soft tissue laceration. No fracture or radiodense foreign body. 1. Laceration of right ring finger without foreign body without damage to nail, initial encounter    - diphth,pertus,acell,,tetanus (BOOSTRIX TDAP) 2.5-8-5 Lf-mcg-Lf/0.5mL susp suspension; 0.5 mL by IntraMUSCular route once for 1 dose.   Dispense: 0.5 mL; Refill: 0          Eldon Lechuga MD

## 2021-07-30 NOTE — PATIENT INSTRUCTIONS
Keep the wound clean & dry, but it is OK to wash your hands - just dry it off thoroughly. Apply Bacitracin and bandage. Sutures should be ready to remove in 8-10 days. Cuts on the Hand Closed With Stitches: Care Instructions  Your Care Instructions     A cut on your hand can be on your fingers, your thumb, or the front or back of your hand. Sometimes a cut can injure the tendons, blood vessels, or nerves of your hand. The doctor used stitches to close the cut. Using stitches also helps the cut heal and reduces scarring. The doctor may have given you a splint to help prevent you from moving your hand, fingers, or thumb. If the cut went deep and through the skin, the doctor put in two layers of stitches. The deeper layer brings the deep part of the cut together. These stitches will dissolve and don't need to be removed. The stitches in the upper layer are the ones you see on the cut. You will probably have a bandage. You will need to have the stitches removed, usually in 7 to 14 days. The doctor may suggest that you see a hand specialist if the cut is very deep or if you have trouble moving your fingers or have less feeling in your hand. The doctor has checked you carefully, but problems can develop later. If you notice any problems or new symptoms, get medical treatment right away. Follow-up care is a key part of your treatment and safety. Be sure to make and go to all appointments, and call your doctor if you are having problems. It's also a good idea to know your test results and keep a list of the medicines you take. How can you care for yourself at home? · Keep the cut dry for the first 24 to 48 hours. After this, you can shower if your doctor okays it. Pat the cut dry. · Don't soak the cut, such as in a bathtub. Your doctor will tell you when it's safe to get the cut wet. · If your doctor told you how to care for your cut, follow your doctor's instructions.  If you did not get instructions, follow this general advice:  ? After the first 24 to 48 hours, wash around the cut with clean water 2 times a day. Don't use hydrogen peroxide or alcohol, which can slow healing. ? You may cover the cut with a thin layer of petroleum jelly, such as Vaseline, and a nonstick bandage. ? Apply more petroleum jelly and replace the bandage as needed. · Prop up the sore hand on a pillow anytime you sit or lie down during the next 3 days. Try to keep it above the level of your heart. This will help reduce swelling. · Avoid any activity that could cause your cut to reopen. · Do not remove the stitches on your own. Your doctor will tell you when to come back to have the stitches removed. · Be safe with medicines. Take pain medicines exactly as directed. ? If the doctor gave you a prescription medicine for pain, take it as prescribed. ? If you are not taking a prescription pain medicine, ask your doctor if you can take an over-the-counter medicine. When should you call for help? Call your doctor now or seek immediate medical care if:    · You have new pain, or your pain gets worse.     · The skin near the cut is cold or pale or changes color.     · You have tingling, weakness, or numbness near the cut.     · The cut starts to bleed, and blood soaks through the bandage. Oozing small amounts of blood is normal.     · You have trouble moving the area of the hand near the cut.     · You have symptoms of infection, such as:  ? Increased pain, swelling, warmth, or redness around the cut.  ? Red streaks leading from the cut.  ? Pus draining from the cut.  ? A fever. Watch closely for changes in your health, and be sure to contact your doctor if:    · You do not get better as expected. Where can you learn more? Go to http://www.gray.com/  Enter T250 in the search box to learn more about \"Cuts on the Hand Closed With Stitches: Care Instructions. \"  Current as of: February 26, 2020               Content Version: 12.8  © 6187-0238 [employer]. Care instructions adapted under license by [employer] (which disclaims liability or warranty for this information). If you have questions about a medical condition or this instruction, always ask your healthcare professional. [address] any warranty or liability for your use of this information.

## 2021-08-11 ENCOUNTER — OFFICE VISIT (OUTPATIENT)
Dept: PRIMARY CARE CLINIC | Age: 36
End: 2021-08-11
Payer: COMMERCIAL

## 2021-08-11 VITALS
WEIGHT: 255 LBS | RESPIRATION RATE: 16 BRPM | TEMPERATURE: 98.6 F | OXYGEN SATURATION: 98 % | HEART RATE: 70 BPM | DIASTOLIC BLOOD PRESSURE: 93 MMHG | HEIGHT: 72 IN | BODY MASS INDEX: 34.54 KG/M2 | SYSTOLIC BLOOD PRESSURE: 138 MMHG

## 2021-08-11 DIAGNOSIS — Z48.02 ENCOUNTER FOR REMOVAL OF SUTURES: ICD-10-CM

## 2021-08-11 DIAGNOSIS — L08.9 LACERATION OF FINGER WITH INFECTION, INITIAL ENCOUNTER: Primary | ICD-10-CM

## 2021-08-11 DIAGNOSIS — S61.219A LACERATION OF FINGER WITH INFECTION, INITIAL ENCOUNTER: Primary | ICD-10-CM

## 2021-08-11 PROCEDURE — 99213 OFFICE O/P EST LOW 20 MIN: CPT | Performed by: NURSE PRACTITIONER

## 2021-08-11 PROCEDURE — S0630 REMOVAL OF SUTURES: HCPCS | Performed by: NURSE PRACTITIONER

## 2021-08-11 RX ORDER — CEPHALEXIN 500 MG/1
500 CAPSULE ORAL 4 TIMES DAILY
Qty: 28 CAPSULE | Refills: 0 | Status: SHIPPED | OUTPATIENT
Start: 2021-08-11 | End: 2021-08-18

## 2021-08-11 NOTE — PATIENT INSTRUCTIONS
Cuts: Care Instructions  Your Care Instructions  A cut can happen anywhere on your body. Stitches, staples, skin adhesives, or pieces of tape called Steri-Strips are sometimes used to keep the edges of a cut together and help it heal. Steri-Strips can be used by themselves or with stitches or staples. Sometimes cuts are left open. If the cut went deep and through the skin, the doctor may have closed the cut in two layers. A deeper layer of stitches brings the deep part of the cut together. These stitches will dissolve and don't need to be removed. The upper layer closure, which could be stitches, staples, Steri-Strips, or adhesive, is what you see on the cut. A cut is often covered by a bandage. The doctor has checked you carefully, but problems can develop later. If you notice any problems or new symptoms, get medical treatment right away. Follow-up care is a key part of your treatment and safety. Be sure to make and go to all appointments, and call your doctor if you are having problems. It's also a good idea to know your test results and keep a list of the medicines you take. How can you care for yourself at home? If a cut is open or closed  · Prop up the sore area on a pillow anytime you sit or lie down during the next 3 days. Try to keep it above the level of your heart. This will help reduce swelling. · Keep the cut dry for the first 24 to 48 hours. After this, you can shower if your doctor okays it. Pat the cut dry. · Don't soak the cut, such as in a bathtub. Your doctor will tell you when it's safe to get the cut wet. · After the first 24 to 48 hours, clean the cut with soap and water 2 times a day unless your doctor gives you different instructions. ? Don't use hydrogen peroxide or alcohol, which can slow healing. ? You may cover the cut with a thin layer of petroleum jelly and a nonstick bandage.   ? If the doctor put a bandage over the cut, put on a new bandage after cleaning the cut or if the bandage gets wet or dirty. · Avoid any activity that could cause your cut to reopen. · Be safe with medicines. Read and follow all instructions on the label. ? If the doctor gave you a prescription medicine for pain, take it as prescribed. ? If you are not taking a prescription pain medicine, ask your doctor if you can take an over-the-counter medicine. If the cut is closed with stitches, staples, or Steri-Strips  · Follow the above instructions for open or closed cuts. · Do not remove the stitches or staples on your own. Your doctor will tell you when to come back to have the stitches or staples removed. · Leave Steri-Strips on until they fall off. If the cut is closed with a skin adhesive  · Follow the above instructions for open or closed cuts. · Leave the skin adhesive on your skin until it falls off on its own. This may take 5 to 10 days. · Do not scratch, rub, or pick at the adhesive. · Do not put the sticky part of a bandage directly on the adhesive. · Do not put any kind of ointment, cream, or lotion over the area. This can make the adhesive fall off too soon. Do not use hydrogen peroxide or alcohol, which can slow healing. When should you call for help? Call your doctor now or seek immediate medical care if:    · You have new pain, or your pain gets worse.     · The skin near the cut is cold or pale or changes color.     · You have tingling, weakness, or numbness near the cut.     · The cut starts to bleed, and blood soaks through the bandage. Oozing small amounts of blood is normal.     · You have trouble moving the area near the cut.     · You have symptoms of infection, such as:  ? Increased pain, swelling, warmth, or redness around the cut.  ? Red streaks leading from the cut.  ? Pus draining from the cut.  ? A fever. Watch closely for changes in your health, and be sure to contact your doctor if:    · The cut reopens.     · You do not get better as expected.    Where can you learn more? Go to http://www.gray.com/  Enter M735 in the search box to learn more about \"Cuts: Care Instructions. \"  Current as of: February 26, 2020               Content Version: 12.8  © 2006-2021 [employer]. Care instructions adapted under license by [employer] (which disclaims liability or warranty for this information). If you have questions about a medical condition or this instruction, always ask your healthcare professional. [address] any warranty or liability for your use of this information. Learning About Stitches and Staples Removal  When are stitches and staples removed? Your doctor will tell you when to have your stitches or staples removed, usually in 7 to 14 days. How long you'll be told to wait will depend on things like where the wound is located, how big and how deep the wound is, and what your general health is like. Do not remove the stitches on your own. Stitches on the face are usually removed within a week. But stitches and staples on other areas of the body, such as on the back or belly or over a joint, may need to stay in place longer, often a week or two. Be sure to follow your doctor's instructions. How are stitches and staples removed? It usually doesn't hurt when the doctor removes the stitches or staples. You may feel a tug as each stitch or staple is removed. · You will either be seated or lying down. · To remove stitches, the doctor will use scissors to cut each of the knots and then pull the threads out. · To remove staples, the doctor will use a tool to take out the staples one at a time. · The area may still feel tender after the stitches or staples are gone. But it should feel better within a few minutes or up to a few hours. What can you expect after stitches and staples are removed? Depending on the type and location of the cut, you will have a scar. Scars usually fade over time. Keep the area clean, but you won't need a bandage. When should you call for help? Call your doctor now or seek immediate medical care if :  · You have new pain, or your pain gets worse. · You have trouble moving the area near the scar. · You have symptoms of infection, such as:  ? Increased pain, swelling, warmth, or redness around the scar. ? Red streaks leading from the scar. ? Pus draining from the scar. ? A fever. Watch closely for changes in your health, and be sure to contact your doctor if:   · The scar opens. · You do not get better as expected. Follow-up care is a key part of your treatment and safety. Be sure to make and go to all appointments, and call your doctor if you do not get better as expected. It's also a good idea to keep a list of the medicines you take. Where can you learn more? Go to http://www.gray.com/  Enter L659 in the search box to learn more about \"Learning About Stitches and Staples Removal.\"  Current as of: February 26, 2020               Content Version: 12.8  © 2586-0553 [employer]. Care instructions adapted under license by [employer] (which disclaims liability or warranty for this information). If you have questions about a medical condition or this instruction, always ask your healthcare professional. [address] any warranty or liability for your use of this information.

## 2021-08-11 NOTE — PROGRESS NOTES
Subjective:   Susan No is a 38 y.o. is here for suture removal.    Objective:   Patient appears well. Wound is healing well, with some erythema, tenderness along wound margins. No drainage. He reports some diminished sensation to distal tip of finger. Capillary refill is brisk, <2-3 seconds. Normal ROM of finger. Assessment/Plan:       ICD-10-CM ICD-9-CM    1. Laceration of finger with infection, initial encounter  S61.219A 883.1     L08.9     2. Encounter for removal of sutures  Z48.02 V58.32 SUTURE / STAPLE REMOVAL BY PROVIDER      SUTURE REMOVAL KIT     Orders Placed This Encounter    cephALEXin (KEFLEX) 500 mg capsule     Wound healing well, ready for suture removal. 7 sutures removed, pt tolerated well. Use bacitracin, keep covered while working and outside of home, clean with soap and water. Antibiotics per orders. Follow-up if any persistent or worsening symptoms.     Jethro Guerrier NP

## 2022-03-19 PROBLEM — E66.01 SEVERE OBESITY (HCC): Status: ACTIVE | Noted: 2018-11-30

## 2023-05-20 RX ORDER — FLUTICASONE PROPIONATE 50 MCG
2 SPRAY, SUSPENSION (ML) NASAL DAILY
COMMUNITY
Start: 2020-02-11